# Patient Record
Sex: MALE | Race: WHITE | NOT HISPANIC OR LATINO | Employment: FULL TIME | URBAN - METROPOLITAN AREA
[De-identification: names, ages, dates, MRNs, and addresses within clinical notes are randomized per-mention and may not be internally consistent; named-entity substitution may affect disease eponyms.]

---

## 2017-01-26 ENCOUNTER — GENERIC CONVERSION - ENCOUNTER (OUTPATIENT)
Dept: OTHER | Facility: OTHER | Age: 62
End: 2017-01-26

## 2017-01-30 ENCOUNTER — ALLSCRIPTS OFFICE VISIT (OUTPATIENT)
Dept: OTHER | Facility: OTHER | Age: 62
End: 2017-01-30

## 2017-01-30 DIAGNOSIS — Z12.5 ENCOUNTER FOR SCREENING FOR MALIGNANT NEOPLASM OF PROSTATE: ICD-10-CM

## 2017-01-30 DIAGNOSIS — E78.2 MIXED HYPERLIPIDEMIA: ICD-10-CM

## 2017-01-30 DIAGNOSIS — I10 ESSENTIAL (PRIMARY) HYPERTENSION: ICD-10-CM

## 2017-03-15 ENCOUNTER — APPOINTMENT (OUTPATIENT)
Dept: URGENT CARE | Facility: MEDICAL CENTER | Age: 62
End: 2017-03-15
Payer: OTHER MISCELLANEOUS

## 2017-03-15 PROCEDURE — G0382 LEV 3 HOSP TYPE B ED VISIT: HCPCS

## 2017-03-15 PROCEDURE — 99283 EMERGENCY DEPT VISIT LOW MDM: CPT

## 2017-03-17 ENCOUNTER — ALLSCRIPTS OFFICE VISIT (OUTPATIENT)
Dept: OTHER | Facility: OTHER | Age: 62
End: 2017-03-17

## 2017-04-10 ENCOUNTER — GENERIC CONVERSION - ENCOUNTER (OUTPATIENT)
Dept: OTHER | Facility: OTHER | Age: 62
End: 2017-04-10

## 2017-06-05 ENCOUNTER — ALLSCRIPTS OFFICE VISIT (OUTPATIENT)
Dept: OTHER | Facility: OTHER | Age: 62
End: 2017-06-05

## 2017-06-27 ENCOUNTER — GENERIC CONVERSION - ENCOUNTER (OUTPATIENT)
Dept: OTHER | Facility: OTHER | Age: 62
End: 2017-06-27

## 2017-09-15 ENCOUNTER — GENERIC CONVERSION - ENCOUNTER (OUTPATIENT)
Dept: OTHER | Facility: OTHER | Age: 62
End: 2017-09-15

## 2017-11-30 ENCOUNTER — GENERIC CONVERSION - ENCOUNTER (OUTPATIENT)
Dept: FAMILY MEDICINE CLINIC | Facility: CLINIC | Age: 62
End: 2017-11-30

## 2017-12-06 ENCOUNTER — ALLSCRIPTS OFFICE VISIT (OUTPATIENT)
Dept: OTHER | Facility: OTHER | Age: 62
End: 2017-12-06

## 2017-12-07 NOTE — PROGRESS NOTES
Assessment    1  Benign essential hypertension (401 1) (I10)   2  Mixed hyperlipidemia (272 2) (E78 2)    Plan  Benign essential hypertension    · Follow-up visit in 6 months Evaluation and Treatment  Follow-up  Status: Hold For -Scheduling  Requested for: [id number]   · Begin or continue regular aerobic exercise  Gradually work up to at least 3 sessions of30 minutes of exercise a week ; Status:Complete;   Done: 25VIM8893 03:45PM   · Continue with our present treatment plan ; Status:Complete;   Done: [id number]   · We recommend that you follow the Mediterranean diet ; Status:Complete;   Done:40Nrd2047 03:45PM  Watch Na+, regular exercise, medication as Rx'd Check CBC, PSA, CMP, Fasting lipids next visit F/u w/ Urology as scheduled   History of Present Illness  The patient presents for follow-up of essential hypertension  The patient states he has been stable with his blood pressure control since the last visit  He has no comorbid illnesses  Symptoms: The patient is currently asymptomatic  Associated symptoms include no headache-- and-- no focal neurologic deficits  Home monitoring: The patient is not checking blood pressure at home  Medications: the patient is adherent with his medication regimen  Medication(s): a diuretic-- and-- a beta blocking agent  57 y/o WM for f/u HTN  He is scheduled for urological surgery tomorrow for resection of a bladder tumor  The w/u for CA has been (-)  No other c/o today, doing well  Review of Systems   Constitutional: No fever or chills, feels well, no tiredness, no recent weight gain or weight loss  Eyes: No complaints of eye pain, no red eyes, no discharge from eyes, no itchy eyes  ENT: no complaints of earache, no hearing loss, no nosebleeds, no nasal discharge, no sore throat, no hoarseness  Cardiovascular: No complaints of slow heart rate, no fast heart rate, no chest pain, no palpitations, no leg claudication, no lower extremity    Respiratory: No complaints of shortness of breath, no wheezing, no cough, no SOB on exertion, no orthopnea or PND  Gastrointestinal: No complaints of abdominal pain, no constipation, no nausea or vomiting, no diarrhea or bloody stools  Genitourinary: as noted in HPI  Musculoskeletal: No complaints of arthralgia, no myalgias, no joint swelling or stiffness, no limb pain or swelling  Integumentary: No complaints of skin rash or skin lesions, no itching, no skin wound, no dry skin  Neurological: No compliants of headache, no confusion, no convulsions, no numbness or tingling, no dizziness or fainting, no limb weakness, no difficulty walking  Psychiatric: Is not suicidal, no sleep disturbances, no anxiety or depression, no change in personality, no emotional problems  Endocrine: No complaints of proptosis, no hot flashes, no muscle weakness, no erectile dysfunction, no deepening of the voice, no feelings of weakness  Hematologic/Lymphatic: No complaints of swollen glands, no swollen glands in the neck, does not bleed easily, no easy bruising  ROS reviewed  Active Problems  1  Benign essential hypertension (401 1) (I10)   2  Gastroesophageal reflux disease (530 81) (K21 9)   3  Mixed hyperlipidemia (272 2) (E78 2)    Past Medical History    1  History of Acute low back pain due to trauma (724 2,338 11) (M54 5)   2  History of colonic polyps (V12 72) (Z86 010)   3  History of Numbness (782 0) (R20 0)   4  History of Seen in hospital outpatient department   5  History of Weakness of both lower extremities (729 89) (R29 898)    The active problems and past medical history were reviewed and updated today  Surgical History  1  History of Arthroscopy Shoulder Right    The surgical history was reviewed and updated today  Family History  Mother    1  Family history of malignant neoplasm of breast (V16 3) (Z80 3)  Father    2  Family history of Colon cancer   3  Family history of diabetes mellitus (V18 0) (Z83 3)   4   Family history of Hypertension, benign  Paternal Grandmother    11  Family history of Coronary heart disease  Paternal Grandfather    6  Family history of Coronary heart disease    The family history was reviewed and updated today  Social History     · Alcohol ingestion, 1-4 drinks per day (V69 8) (Z78 9)   · Currently works full-time (V49 89) (Z78 9)   · Engages in hobby   · Former smoker (V15 82) (U43 523)   · Lives with wife   · Primary spoken language English  The social history was reviewed and updated today  Current Meds   1  Aspirin EC 81 MG Oral Tablet Delayed Release; Therapy: (Recorded:10Jan2017) to Recorded   2  Cialis 2 5 MG Oral Tablet; Therapy: (Recorded:10Jan2017) to Recorded   3  Esomeprazole Magnesium 40 MG Oral Capsule Delayed Release; TAKE 1 CAPSULE ONCE DAILY  Requested for: 37GKI9068; Last Rx:30Jan2017 Ordered   4  HydroCHLOROthiazide 25 MG Oral Tablet; Therapy: (Recorded:24Jan2017) to Recorded   5  Toprol  MG Oral Tablet Extended Release 24 Hour; Therapy: (Recorded:24Jan2017) to Recorded    Allergies  1  ACE Inhibitors    Vitals  Vital Signs    Recorded: 87NYT6917 34:25HG   Systolic 254, LUE, Sitting   Diastolic 92, LUE, Sitting   Height 5 ft 10 in   Weight 257 lb    BMI Calculated 36 88   BSA Calculated 2 32       Physical Exam   Constitutional  General appearance: No acute distress, well appearing and well nourished  Pulmonary  Respiratory effort: No increased work of breathing or signs of respiratory distress  Auscultation of lungs: Clear to auscultation, equal breath sounds bilaterally, no wheezes, no rales, no rhonci  Cardiovascular  Auscultation of heart: Normal rate and rhythm, normal S1 and S2, without murmurs  Examination of extremities for edema and/or varicosities: Normal    Musculoskeletal  Gait and station: Normal    Digits and nails: Normal without clubbing or cyanosis  Skin  Skin and subcutaneous tissue: Normal without rashes or lesions     Neurologic  Cranial nerves: Cranial nerves 2-12 intact  Psychiatric  Orientation to person, place and time: Normal    Mood and affect: Normal          Health Management  History of Screen for colon cancer   COLONOSCOPY; every 3 years; Last [id number]; Next Due: 77ITK1818;  Overdue    Signatures   Electronically signed by : MK Pak ; Dec  6 2017  3:48PM EST                       (Author)

## 2018-01-11 NOTE — PROGRESS NOTES
History of Present Illness  ED Outreach:   ED Visit Information  ED visit date: 08/30/2017  Diagnosis description: Acute cystitis with hematuria  Facility name: [facility]  Discharge status: Home  Number of ED visits to date: 2  ED severity: 4  Out of network  Outreach Information  Outreach successful  Number of attempts - 1 pending  Care Coordination  Emergent necessity warranted by diagnosis  [facility] PCP  Self transport  Patient was not given a choice of ED  Did not call PCP first  Feels able to call PCP for urgent problems  Understands what emergencies can be handled by PCP  Does not have problems getting appointment with PCP for minor emergency/urgency problems  Practice did not contact patient  No follow up appointment with PCP  Seen for follow up out of network  Patient went out of network - preference  Patient went to ED instead of UC or PCP - perceived severity of illness  Pt not admitted from ED  Patient was not transferred to PCP to schedule follow up appointments in network  Patient without existing specialty follow up appointments in network  Comments:   ER records for visit of 08/30/2017 are not in chart  I will request records today  1) LMOM asking patient for a return call on [email or web address]  Records received from [facility] and scanned into chart  Per ER records, patient was advised to f/u with PCP and ONN urologist Reg Acosta MD)  Pc with patient on [email or web address], Patient was unsatisfied with care at [facility]  Patient also stated that he went to [facility] upon his wife's recommendation  Patient declined to be transferred to PCP to schedule and appt stating that he does not feel it is necessary  Future Appointments    Date/Time Provider Specialty Site   12/06/2017 03:30 PM MK Kang  Family Medicine [facility]     Signatures   Electronically signed by :  Bandar Ji, ; Sep 19 2017  3:09PM EST                       (Author)

## 2018-01-12 VITALS
DIASTOLIC BLOOD PRESSURE: 92 MMHG | SYSTOLIC BLOOD PRESSURE: 142 MMHG | HEIGHT: 70 IN | BODY MASS INDEX: 35.07 KG/M2 | WEIGHT: 245 LBS

## 2018-01-12 VITALS — SYSTOLIC BLOOD PRESSURE: 122 MMHG | DIASTOLIC BLOOD PRESSURE: 84 MMHG

## 2018-01-14 VITALS
SYSTOLIC BLOOD PRESSURE: 118 MMHG | HEIGHT: 70 IN | WEIGHT: 248 LBS | DIASTOLIC BLOOD PRESSURE: 86 MMHG | BODY MASS INDEX: 35.5 KG/M2

## 2018-01-14 VITALS — DIASTOLIC BLOOD PRESSURE: 100 MMHG | SYSTOLIC BLOOD PRESSURE: 152 MMHG

## 2018-01-22 VITALS — SYSTOLIC BLOOD PRESSURE: 128 MMHG | DIASTOLIC BLOOD PRESSURE: 92 MMHG

## 2018-01-23 VITALS
HEIGHT: 70 IN | BODY MASS INDEX: 36.79 KG/M2 | SYSTOLIC BLOOD PRESSURE: 132 MMHG | WEIGHT: 257 LBS | DIASTOLIC BLOOD PRESSURE: 92 MMHG

## 2018-06-05 ENCOUNTER — OFFICE VISIT (OUTPATIENT)
Dept: FAMILY MEDICINE CLINIC | Facility: CLINIC | Age: 63
End: 2018-06-05
Payer: COMMERCIAL

## 2018-06-05 VITALS
TEMPERATURE: 97.5 F | WEIGHT: 260 LBS | HEIGHT: 70 IN | BODY MASS INDEX: 37.22 KG/M2 | HEART RATE: 72 BPM | DIASTOLIC BLOOD PRESSURE: 82 MMHG | OXYGEN SATURATION: 96 % | SYSTOLIC BLOOD PRESSURE: 130 MMHG

## 2018-06-05 DIAGNOSIS — I10 BENIGN ESSENTIAL HYPERTENSION: ICD-10-CM

## 2018-06-05 DIAGNOSIS — E78.2 MIXED HYPERLIPIDEMIA: ICD-10-CM

## 2018-06-05 DIAGNOSIS — K21.9 GASTROESOPHAGEAL REFLUX DISEASE WITHOUT ESOPHAGITIS: Primary | ICD-10-CM

## 2018-06-05 PROCEDURE — 3075F SYST BP GE 130 - 139MM HG: CPT | Performed by: PHYSICIAN ASSISTANT

## 2018-06-05 PROCEDURE — 3008F BODY MASS INDEX DOCD: CPT | Performed by: PHYSICIAN ASSISTANT

## 2018-06-05 PROCEDURE — 3079F DIAST BP 80-89 MM HG: CPT | Performed by: PHYSICIAN ASSISTANT

## 2018-06-05 PROCEDURE — 99214 OFFICE O/P EST MOD 30 MIN: CPT | Performed by: PHYSICIAN ASSISTANT

## 2018-06-05 PROCEDURE — 1036F TOBACCO NON-USER: CPT | Performed by: PHYSICIAN ASSISTANT

## 2018-06-05 RX ORDER — SIMVASTATIN 40 MG
TABLET ORAL
COMMUNITY
End: 2018-07-02 | Stop reason: SDUPTHER

## 2018-06-05 RX ORDER — ESOMEPRAZOLE MAGNESIUM 40 MG/1
40 CAPSULE, DELAYED RELEASE ORAL DAILY
Qty: 90 CAPSULE | Refills: 3 | Status: SHIPPED | OUTPATIENT
Start: 2018-06-05

## 2018-06-05 RX ORDER — MELOXICAM 15 MG/1
TABLET ORAL
COMMUNITY
Start: 2018-06-05

## 2018-06-05 RX ORDER — ASPIRIN 81 MG/1
TABLET ORAL
COMMUNITY

## 2018-06-05 RX ORDER — TADALAFIL 2.5 MG/1
TABLET ORAL
COMMUNITY

## 2018-06-05 RX ORDER — METOPROLOL SUCCINATE 100 MG/1
100 TABLET, EXTENDED RELEASE ORAL
COMMUNITY
Start: 2017-07-07 | End: 2018-07-02 | Stop reason: SDUPTHER

## 2018-06-05 RX ORDER — ESOMEPRAZOLE MAGNESIUM 40 MG/1
1 CAPSULE, DELAYED RELEASE ORAL DAILY
COMMUNITY
End: 2018-06-05 | Stop reason: SDUPTHER

## 2018-06-05 RX ORDER — IBUPROFEN 800 MG/1
TABLET ORAL
COMMUNITY

## 2018-06-05 RX ORDER — HYDROCHLOROTHIAZIDE 25 MG/1
TABLET ORAL
COMMUNITY
End: 2018-07-02 | Stop reason: SDUPTHER

## 2018-06-05 NOTE — PROGRESS NOTES
Assessment/Plan:    Gastroesophageal reflux disease  Well controlled with Nexium  Continue current management    Benign essential hypertension  Well controlled at 130/82  Continue metoprolol succinate and hydrochlorothiazide    Mixed hyperlipidemia  Well controlled on simvastatin 40 mg  Lipid panel at next visit       Diagnoses and all orders for this visit:    Gastroesophageal reflux disease without esophagitis  -     esomeprazole (NexIUM) 40 MG capsule; Take 1 capsule (40 mg total) by mouth daily    Benign essential hypertension    Mixed hyperlipidemia    Other orders  -     aspirin (ECOTRIN LOW STRENGTH) 81 mg EC tablet; Take by mouth  -     Discontinue: esomeprazole (NexIUM) 40 MG capsule; Take 1 capsule by mouth daily  -     tadalafil (CIALIS) 2 5 MG tablet; Take by mouth  -     hydrochlorothiazide (HYDRODIURIL) 25 mg tablet; hydrochlorothiazide 25 mg tablet  -     ibuprofen (MOTRIN) 800 mg tablet; Take 1 tablet 3 times a day by oral route  -     meloxicam (MOBIC) 15 mg tablet;   -     metoprolol succinate (TOPROL-XL) 100 mg 24 hr tablet; Take 100 mg by mouth  -     simvastatin (ZOCOR) 40 mg tablet; simvastatin 40 mg tablet          Subjective:      Patient ID: Winifred Almonte is a 58 y o  male  70-year-old male with history of hyperlipidemia, hypertension, GERD presents for follow-up chronic conditions  Patient's last lipid panel showed total cholesterol 174, LDL 90, HDL 36, triglycerides 238  He is tolerating simvastatin well without side effects such as myalgias  Patient is taking Nexium for GERD and reports good control of symptoms  Blood pressure is well controlled at 130/82  Patient is taking hydrochlorothiazide 25 mg and metoprolol succinate 100 mg daily without side effects such as lightheadedness  No chest pain, headache, vision changes  Does not take his blood pressure at home          The following portions of the patient's history were reviewed and updated as appropriate: allergies, current medications, past family history, past medical history, past social history, past surgical history and problem list     Review of Systems   Constitutional: Negative for diaphoresis, fatigue and fever  HENT: Negative for congestion, ear pain, hearing loss, postnasal drip, rhinorrhea and sore throat  Eyes: Negative for pain, redness and visual disturbance  Respiratory: Negative for cough, shortness of breath and wheezing  Cardiovascular: Negative for chest pain, palpitations and leg swelling  Gastrointestinal: Negative for anal bleeding, constipation, diarrhea, nausea and vomiting  Endocrine: Negative for polydipsia and polyuria  Genitourinary: Negative for dysuria, flank pain and hematuria  Musculoskeletal: Negative for arthralgias, back pain, joint swelling and myalgias  Skin: Negative for pallor, rash and wound  Neurological: Negative for dizziness, syncope, numbness and headaches  Hematological: Negative for adenopathy  Does not bruise/bleed easily  Psychiatric/Behavioral: Negative for dysphoric mood and sleep disturbance  The patient is not nervous/anxious  Objective:      /82   Pulse 72   Temp 97 5 °F (36 4 °C)   Ht 5' 10" (1 778 m)   Wt 118 kg (260 lb)   SpO2 96%   BMI 37 31 kg/m²          Physical Exam   Constitutional: He is oriented to person, place, and time  He appears well-developed and well-nourished  No distress  HENT:   Head: Normocephalic and atraumatic  Mouth/Throat: Oropharynx is clear and moist    Eyes: Conjunctivae and EOM are normal  Pupils are equal, round, and reactive to light  Right eye exhibits no discharge  Left eye exhibits no discharge  No scleral icterus  Neck: Normal range of motion  Neck supple  No thyromegaly present  Cardiovascular: Normal rate, regular rhythm and normal heart sounds  Exam reveals no gallop and no friction rub  No murmur heard  Pulmonary/Chest: Effort normal and breath sounds normal  No respiratory distress  He has no wheezes  He has no rales  Abdominal: Soft  He exhibits no distension and no mass  There is no tenderness  There is no rebound and no guarding  Musculoskeletal: Normal range of motion  He exhibits no edema  Lymphadenopathy:     He has no cervical adenopathy  Neurological: He is alert and oriented to person, place, and time  No cranial nerve deficit  Skin: Skin is warm and dry  No rash noted  He is not diaphoretic  No erythema  No pallor

## 2018-07-02 DIAGNOSIS — E78.2 MIXED HYPERLIPIDEMIA: ICD-10-CM

## 2018-07-02 DIAGNOSIS — I10 ESSENTIAL HYPERTENSION: Primary | ICD-10-CM

## 2018-07-02 RX ORDER — METOPROLOL SUCCINATE 100 MG/1
100 TABLET, EXTENDED RELEASE ORAL DAILY
Qty: 90 TABLET | Refills: 2 | Status: SHIPPED | OUTPATIENT
Start: 2018-07-02 | End: 2019-04-05 | Stop reason: SDUPTHER

## 2018-07-02 RX ORDER — HYDROCHLOROTHIAZIDE 25 MG/1
25 TABLET ORAL DAILY
Qty: 90 TABLET | Refills: 2 | Status: SHIPPED | OUTPATIENT
Start: 2018-07-02 | End: 2019-04-05 | Stop reason: SDUPTHER

## 2018-07-02 RX ORDER — SIMVASTATIN 40 MG
40 TABLET ORAL
Qty: 90 TABLET | Refills: 2 | Status: SHIPPED | OUTPATIENT
Start: 2018-07-02

## 2019-04-03 DIAGNOSIS — I10 ESSENTIAL HYPERTENSION: ICD-10-CM

## 2019-04-05 ENCOUNTER — OFFICE VISIT (OUTPATIENT)
Dept: FAMILY MEDICINE CLINIC | Facility: CLINIC | Age: 64
End: 2019-04-05

## 2019-04-05 VITALS
BODY MASS INDEX: 36.79 KG/M2 | WEIGHT: 257 LBS | HEART RATE: 73 BPM | OXYGEN SATURATION: 98 % | DIASTOLIC BLOOD PRESSURE: 80 MMHG | HEIGHT: 70 IN | SYSTOLIC BLOOD PRESSURE: 140 MMHG

## 2019-04-05 DIAGNOSIS — E66.01 CLASS 2 SEVERE OBESITY WITH SERIOUS COMORBIDITY AND BODY MASS INDEX (BMI) OF 37.0 TO 37.9 IN ADULT, UNSPECIFIED OBESITY TYPE (HCC): ICD-10-CM

## 2019-04-05 DIAGNOSIS — I10 ESSENTIAL HYPERTENSION: ICD-10-CM

## 2019-04-05 DIAGNOSIS — K21.9 GASTROESOPHAGEAL REFLUX DISEASE WITHOUT ESOPHAGITIS: ICD-10-CM

## 2019-04-05 DIAGNOSIS — Z71.85 IMMUNIZATION COUNSELING: ICD-10-CM

## 2019-04-05 DIAGNOSIS — K63.5 POLYP OF COLON, UNSPECIFIED PART OF COLON, UNSPECIFIED TYPE: ICD-10-CM

## 2019-04-05 DIAGNOSIS — Z86.19 HISTORY OF CHICKENPOX: ICD-10-CM

## 2019-04-05 DIAGNOSIS — E78.2 MIXED HYPERLIPIDEMIA: ICD-10-CM

## 2019-04-05 DIAGNOSIS — Z11.59 ENCOUNTER FOR HEPATITIS C SCREENING TEST FOR LOW RISK PATIENT: ICD-10-CM

## 2019-04-05 PROCEDURE — 99214 OFFICE O/P EST MOD 30 MIN: CPT | Performed by: FAMILY MEDICINE

## 2019-04-05 RX ORDER — METOPROLOL SUCCINATE 100 MG/1
TABLET, EXTENDED RELEASE ORAL
Qty: 90 TABLET | Refills: 2 | OUTPATIENT
Start: 2019-04-05

## 2019-04-05 RX ORDER — HYDROCHLOROTHIAZIDE 25 MG/1
25 TABLET ORAL DAILY
Qty: 90 TABLET | Refills: 0 | Status: SHIPPED | OUTPATIENT
Start: 2019-04-05

## 2019-04-05 RX ORDER — METOPROLOL SUCCINATE 100 MG/1
100 TABLET, EXTENDED RELEASE ORAL DAILY
Qty: 90 TABLET | Refills: 0 | Status: SHIPPED | OUTPATIENT
Start: 2019-04-05

## 2019-04-05 RX ORDER — HYDROCHLOROTHIAZIDE 25 MG/1
TABLET ORAL
Qty: 90 TABLET | Refills: 2 | OUTPATIENT
Start: 2019-04-05

## 2020-12-22 ENCOUNTER — TELEPHONE (OUTPATIENT)
Dept: FAMILY MEDICINE CLINIC | Facility: CLINIC | Age: 65
End: 2020-12-22